# Patient Record
Sex: FEMALE | Race: WHITE | NOT HISPANIC OR LATINO
[De-identification: names, ages, dates, MRNs, and addresses within clinical notes are randomized per-mention and may not be internally consistent; named-entity substitution may affect disease eponyms.]

---

## 2019-10-20 ENCOUNTER — RESULT REVIEW (OUTPATIENT)
Age: 69
End: 2019-10-20

## 2023-09-25 ENCOUNTER — APPOINTMENT (OUTPATIENT)
Dept: ORTHOPEDIC SURGERY | Facility: CLINIC | Age: 73
End: 2023-09-25
Payer: MEDICARE

## 2023-09-25 ENCOUNTER — NON-APPOINTMENT (OUTPATIENT)
Age: 73
End: 2023-09-25

## 2023-09-25 VITALS
HEIGHT: 67 IN | BODY MASS INDEX: 25.11 KG/M2 | HEART RATE: 80 BPM | SYSTOLIC BLOOD PRESSURE: 151 MMHG | WEIGHT: 160 LBS | DIASTOLIC BLOOD PRESSURE: 90 MMHG

## 2023-09-25 DIAGNOSIS — M54.9 DORSALGIA, UNSPECIFIED: ICD-10-CM

## 2023-09-25 PROCEDURE — 72170 X-RAY EXAM OF PELVIS: CPT

## 2023-09-25 PROCEDURE — 72110 X-RAY EXAM L-2 SPINE 4/>VWS: CPT

## 2023-09-25 PROCEDURE — 99204 OFFICE O/P NEW MOD 45 MIN: CPT | Mod: 25

## 2023-09-25 RX ORDER — LEVOTHYROXINE SODIUM 100 UG/1
100 TABLET ORAL
Refills: 0 | Status: ACTIVE | COMMUNITY

## 2023-10-18 ENCOUNTER — TRANSCRIPTION ENCOUNTER (OUTPATIENT)
Age: 73
End: 2023-10-18

## 2023-10-20 ENCOUNTER — OUTPATIENT (OUTPATIENT)
Dept: OUTPATIENT SERVICES | Facility: HOSPITAL | Age: 73
LOS: 1 days | Discharge: HOME | End: 2023-10-20
Payer: COMMERCIAL

## 2023-10-20 ENCOUNTER — APPOINTMENT (OUTPATIENT)
Dept: ORTHOPEDIC SURGERY | Facility: HOSPITAL | Age: 73
End: 2023-10-20

## 2023-10-20 VITALS
HEIGHT: 66 IN | TEMPERATURE: 98 F | OXYGEN SATURATION: 100 % | DIASTOLIC BLOOD PRESSURE: 61 MMHG | SYSTOLIC BLOOD PRESSURE: 128 MMHG | RESPIRATION RATE: 14 BRPM | HEART RATE: 72 BPM | WEIGHT: 158.07 LBS

## 2023-10-20 VITALS
SYSTOLIC BLOOD PRESSURE: 130 MMHG | DIASTOLIC BLOOD PRESSURE: 64 MMHG | RESPIRATION RATE: 18 BRPM | HEART RATE: 68 BPM | OXYGEN SATURATION: 100 %

## 2023-10-20 DIAGNOSIS — Z98.890 OTHER SPECIFIED POSTPROCEDURAL STATES: Chronic | ICD-10-CM

## 2023-10-20 DIAGNOSIS — M48.062 SPINAL STENOSIS, LUMBAR REGION WITH NEUROGENIC CLAUDICATION: ICD-10-CM

## 2023-10-20 PROCEDURE — 64483 NJX AA&/STRD TFRM EPI L/S 1: CPT | Mod: 50

## 2023-10-20 RX ORDER — LEVOTHYROXINE SODIUM 125 MCG
1 TABLET ORAL
Refills: 0 | DISCHARGE

## 2023-10-20 RX ORDER — LOVASTATIN 20 MG
1 TABLET ORAL
Refills: 0 | DISCHARGE

## 2023-10-20 RX ORDER — ROSUVASTATIN CALCIUM 5 MG/1
1 TABLET ORAL
Refills: 0 | DISCHARGE

## 2023-10-23 PROBLEM — Z86.39 PERSONAL HISTORY OF OTHER ENDOCRINE, NUTRITIONAL AND METABOLIC DISEASE: Chronic | Status: ACTIVE | Noted: 2023-10-18

## 2023-10-23 PROBLEM — N39.45: Chronic | Status: ACTIVE | Noted: 2023-10-18

## 2023-11-08 ENCOUNTER — APPOINTMENT (OUTPATIENT)
Dept: ORTHOPEDIC SURGERY | Facility: CLINIC | Age: 73
End: 2023-11-08
Payer: MEDICARE

## 2023-11-08 VITALS
DIASTOLIC BLOOD PRESSURE: 79 MMHG | HEIGHT: 67 IN | WEIGHT: 158 LBS | SYSTOLIC BLOOD PRESSURE: 121 MMHG | HEART RATE: 62 BPM | BODY MASS INDEX: 24.8 KG/M2

## 2023-11-08 PROCEDURE — 99213 OFFICE O/P EST LOW 20 MIN: CPT

## 2024-02-14 ENCOUNTER — APPOINTMENT (OUTPATIENT)
Dept: ORTHOPEDIC SURGERY | Facility: CLINIC | Age: 74
End: 2024-02-14
Payer: MEDICARE

## 2024-02-14 VITALS
SYSTOLIC BLOOD PRESSURE: 142 MMHG | HEART RATE: 75 BPM | WEIGHT: 159 LBS | HEIGHT: 67 IN | BODY MASS INDEX: 24.96 KG/M2 | DIASTOLIC BLOOD PRESSURE: 82 MMHG

## 2024-02-14 PROCEDURE — 99214 OFFICE O/P EST MOD 30 MIN: CPT | Mod: 25

## 2024-02-14 PROCEDURE — 96372 THER/PROPH/DIAG INJ SC/IM: CPT

## 2024-02-14 RX ORDER — TIZANIDINE 2 MG/1
2 TABLET ORAL EVERY 8 HOURS
Qty: 30 | Refills: 0 | Status: ACTIVE | COMMUNITY
Start: 2024-02-14 | End: 1900-01-01

## 2024-02-14 RX ORDER — KETOROLAC TROMETHAMINE 30 MG/ML
30 INJECTION, SOLUTION INTRAMUSCULAR; INTRAVENOUS
Qty: 1 | Refills: 0 | Status: COMPLETED | OUTPATIENT
Start: 2024-02-14

## 2024-02-14 RX ADMIN — KETOROLAC TROMETHAMINE 0 MG/ML: 30 INJECTION, SOLUTION INTRAMUSCULAR at 00:00

## 2024-02-14 NOTE — DISCUSSION/SUMMARY
[Medication Risks Reviewed] : Medication risks reviewed [de-identified] : X-rays of the lumbar spine performed in September 2023 reviewed by me again with the patient.  She has degenerative change lumbar spine primarily at L4-5 L5-S1 with spondylolisthesis noted at L5-S1.  Her current symptoms are likely related to facet arthropathy in the setting of spondylolisthesis and disc degeneration.  She denies any significant radicular pain at this time though her pain does radiate somewhat into the right buttock.  She was advised to Continue tylenol/advil prn Tizanidine Rx provided Acupuncture and PT Rx provided Toradal 30 mg IM by LPN - no incident  She will contact the office in a week to give me an update on her symptoms.  If there is persistence of pain especially with her buttock pain lumbar epidural steroid injection bilaterally at the L5 level may be considered given the history of spinal stenosis and neurogenic claudication that the patient has had.  She also has had a significant response to bilateral L4 transforaminal epidural injections in the past.  The patient was educated regarding their condition, treatment options as well as prescribed course of treatment.  Risks and benefits as well as alternatives to the proposed treatment were also provided to the patient  They were given the opportunity to have all their questions answered to their satisfaction.  Vital signs were reviewed with the patient and the patient was instructed to followup with their primary care provider for further management. There were no PAs or scribes used in the evaluation, exam or treatment plan discussion. The surgeon was the primary evaluating or treating physician as noted above.

## 2024-02-14 NOTE — HISTORY OF PRESENT ILLNESS
[Walking] : walking [Daily] : ~He/She~ states the symptoms seem to be occuring daily [Rest] : relieved by rest [Improving] : improving [___ wks] : [unfilled] week(s) ago [6] : a current pain level of 6/10 [de-identified] : Patient is here today due to  acute flareup of low back oaun onchronic low back pain.  Patient states after her lumbar epidural injection in October 10/20/23 bilateral L4 no longer getting leg pain but still with her low back pain. No current medications started yoga, does it once a week, walks for exercise. [de-identified] : advil

## 2024-02-14 NOTE — PHYSICAL EXAM
[Normal] : Gait: normal [] : Motor: [___/5] : left ([unfilled]/5) [LE] : Sensory: Intact in bilateral lower extremities [1+] : left ankle jerk 1+ [SLR] : negative straight leg raise [Plantar Reflex Right Only] : absent on the right [Plantar Reflex Left Only] : absent on the left [DTR Reflexes Clonus Of Right Ankle (___ Beats)] : absent on the right [DTR Reflexes Clonus Of Left Ankle (___ Beats)] : absent on the left [de-identified] : The pt is awake, alert and oriented to self, place and time, is comfortable and in no acute distress. Inspection of neck, back and lower extremities bilaterally reveals no rashes or ecchymotic lesions.  There is no obvious abnormal spinal curvature in the sagittal and coronal planes. There is no tenderness over the cervical, thoracic or lumbar spine, or the paraspinal or upper and lower extremities musculature. There is no sacroiliac tenderness. No greater trochanteric tenderness bilaterally. No atrophy or abnormal movements noted in the upper or lower extremities. There is no swelling noted in the upper or lower extremities bilaterally. No cervical lymphadenopathy noted anteriorly. No joint laxity noted in the upper and lower extremity joints bilaterally. Hip range of motion is degrees internal rotation 30 external rotation without pain. Full range of motion of the shoulders bilaterally with no significant pain There is no groin pain with hip internal rotation and a negative STEVEN test bilaterally. [de-identified] : flex to her ankles, ext 30 degrees with low back pain minimal left gluteal tenderness

## 2024-06-12 ENCOUNTER — APPOINTMENT (OUTPATIENT)
Dept: ORTHOPEDIC SURGERY | Facility: CLINIC | Age: 74
End: 2024-06-12
Payer: MEDICARE

## 2024-06-12 VITALS
DIASTOLIC BLOOD PRESSURE: 83 MMHG | BODY MASS INDEX: 25.11 KG/M2 | HEART RATE: 67 BPM | WEIGHT: 160 LBS | SYSTOLIC BLOOD PRESSURE: 143 MMHG | HEIGHT: 67 IN

## 2024-06-12 DIAGNOSIS — M43.10 SPONDYLOLISTHESIS, SITE UNSPECIFIED: ICD-10-CM

## 2024-06-12 DIAGNOSIS — M48.062 SPINAL STENOSIS, LUMBAR REGION WITH NEUROGENIC CLAUDICATION: ICD-10-CM

## 2024-06-12 DIAGNOSIS — M51.37 OTHER INTERVERTEBRAL DISC DEGENERATION, LUMBOSACRAL REGION: ICD-10-CM

## 2024-06-12 DIAGNOSIS — M54.16 RADICULOPATHY, LUMBAR REGION: ICD-10-CM

## 2024-06-12 PROCEDURE — 99213 OFFICE O/P EST LOW 20 MIN: CPT

## 2024-06-14 NOTE — HISTORY OF PRESENT ILLNESS
[4] : a current pain level of 4/10 [5] : a maximum pain level of 5/10 [de-identified] : Patient is here for follow up of low back pain. Patient c/o right lateral thigh shooting pain and throbbing. Patient relieves pain with Advil and Tylenol.   - Summary : A patient with chronic low back pain and radiculopathy, presented for a follow-up visit regarding her ongoing symptoms and treatment plan. - Chief Complaint (CC) : Worsening low back pain and leg symptoms. - History of Present Illness (HPI) : Chief Complaint: Worsening low back pain and leg symptoms. Onset of Symptoms: Symptoms have been gradually worsening over the past few months. Characterization of the Discomfort: Lower back pain, leg heaviness, and pulling sensation in the front of the legs. Duration: Ongoing for several months. Location: Lower back, radiating to the legs, predominantly on the right side. Severity: Moderate to severe, interfering with sleep. Aggravating Factors: Not specified. Relieving Factors: Previous epidural steroid injection provided temporary relief. Associated Signs and Symptoms: Leg heaviness, pulling sensation in the front of the legs. Temporal Factors: Symptoms are worse at night, waking up 5-6 times to change positions. Context: Patient had a bilateral L4 epidural steroid injection in October 2023, which provided relief initially. Associated Symptoms: None mentioned. Severity and Impact: Moderate to severe, interfering with sleep and daily activities. Comparative Analysis: Symptoms are gradually worsening compared to the previous visit in February 2024. Patient's Medical Regimen: Currently taking Tylenol, Advil, tizanidine, and musceralax as prescribed. Contextual Factors: Patient has tried physical therapy and acupuncture but has not started them yet due to insurance coverage issues. Recent Medical Interventions: Previous epidural steroid injection in October 2023.

## 2024-06-14 NOTE — PHYSICAL EXAM
[Normal] : Gait: normal [] : Motor: [___/5] : left ([unfilled]/5) [LE] : Sensory: Intact in bilateral lower extremities [1+] : left ankle jerk 1+ [SLR] : negative straight leg raise [Plantar Reflex Right Only] : absent on the right [Plantar Reflex Left Only] : absent on the left [DTR Reflexes Clonus Of Left Ankle (___ Beats)] : absent on the left [DTR Reflexes Clonus Of Right Ankle (___ Beats)] : absent on the right [de-identified] : The pt is awake, alert and oriented to self, place and time, is comfortable and in no acute distress. Inspection of neck, back and lower extremities bilaterally reveals no rashes or ecchymotic lesions.  There is no obvious abnormal spinal curvature in the sagittal and coronal planes. There is no tenderness over the cervical, thoracic or lumbar spine, or the paraspinal or upper and lower extremities musculature. There is no sacroiliac tenderness. No greater trochanteric tenderness bilaterally. No atrophy or abnormal movements noted in the upper or lower extremities. There is no swelling noted in the upper or lower extremities bilaterally. No cervical lymphadenopathy noted anteriorly. No joint laxity noted in the upper and lower extremity joints bilaterally. Hip range of motion is degrees internal rotation 30 external rotation without pain. Full range of motion of the shoulders bilaterally with no significant pain There is no groin pain with hip internal rotation and a negative STEVEN test bilaterally. [de-identified] : flex to her ankles, ext 30 degrees with low back pain

## 2024-06-14 NOTE — DISCUSSION/SUMMARY
[Medication Risks Reviewed] : Medication risks reviewed [de-identified] : Assessment: - Summary : Sosa Parisi is a patient with chronic low back pain and radiculopathy, likely due to degenerative disc disease or spinal stenosis at the L4-L5 and L5-S1 levels. Her symptoms have been gradually worsening, with increased lower back pain and leg symptoms, predominantly on the right side. - Problems : - Chronic low back pain  - Radiculopathy  - Differential Diagnosis : - Degenerative disc disease  - Spinal stenosis  - Herniated disc  Plan: - Summary : The plan is to proceed with another epidural steroid injection targeting the right L4 and L5 nerve roots, as the previous injection provided temporary relief. Additionally, the patient will be advised to continue with conservative management, including medication, acupuncture, and physical therapy, if covered by insurance. - Plan : - Epidural steroid injection targeting the right L4 and L5 nerve roots, scheduled for the following week.  - Continue taking Tylenol, Advil, tizanidine, and musceralax as prescribed.  - Trial of gabapentin at night for leg symptoms, if tolerated without gastrointestinal side effects.  - Pursue acupuncture treatment, as insurance covers it fully.  - Consider physical therapy if insurance coverage becomes available.  - Follow-up visit after the epidural steroid injection to assess response and adjust treatment plan as needed.

## 2024-06-18 ENCOUNTER — TRANSCRIPTION ENCOUNTER (OUTPATIENT)
Age: 74
End: 2024-06-18

## 2024-06-20 VITALS
SYSTOLIC BLOOD PRESSURE: 120 MMHG | DIASTOLIC BLOOD PRESSURE: 64 MMHG | HEART RATE: 75 BPM | OXYGEN SATURATION: 100 % | RESPIRATION RATE: 17 BRPM | TEMPERATURE: 98 F

## 2024-06-21 ENCOUNTER — APPOINTMENT (OUTPATIENT)
Dept: ORTHOPEDIC SURGERY | Facility: HOSPITAL | Age: 74
End: 2024-06-21

## 2024-06-21 ENCOUNTER — OUTPATIENT (OUTPATIENT)
Dept: OUTPATIENT SERVICES | Facility: HOSPITAL | Age: 74
LOS: 1 days | End: 2024-06-21
Payer: COMMERCIAL

## 2024-06-21 VITALS
DIASTOLIC BLOOD PRESSURE: 77 MMHG | SYSTOLIC BLOOD PRESSURE: 147 MMHG | OXYGEN SATURATION: 100 % | HEART RATE: 70 BPM | RESPIRATION RATE: 14 BRPM

## 2024-06-21 DIAGNOSIS — M51.37 OTHER INTERVERTEBRAL DISC DEGENERATION, LUMBOSACRAL REGION: ICD-10-CM

## 2024-06-21 DIAGNOSIS — M54.16 RADICULOPATHY, LUMBAR REGION: ICD-10-CM

## 2024-06-21 DIAGNOSIS — Z90.89 ACQUIRED ABSENCE OF OTHER ORGANS: Chronic | ICD-10-CM

## 2024-06-21 DIAGNOSIS — M43.10 SPONDYLOLISTHESIS, SITE UNSPECIFIED: ICD-10-CM

## 2024-06-21 DIAGNOSIS — M48.062 SPINAL STENOSIS, LUMBAR REGION WITH NEUROGENIC CLAUDICATION: ICD-10-CM

## 2024-06-21 PROCEDURE — 64483 NJX AA&/STRD TFRM EPI L/S 1: CPT | Mod: RT

## 2024-06-21 PROCEDURE — 64484 NJX AA&/STRD TFRM EPI L/S EA: CPT | Mod: RT

## 2024-07-10 ENCOUNTER — APPOINTMENT (OUTPATIENT)
Dept: ORTHOPEDIC SURGERY | Facility: CLINIC | Age: 74
End: 2024-07-10
Payer: MEDICARE

## 2024-07-10 VITALS
HEIGHT: 67 IN | SYSTOLIC BLOOD PRESSURE: 131 MMHG | WEIGHT: 160 LBS | HEART RATE: 64 BPM | DIASTOLIC BLOOD PRESSURE: 81 MMHG | BODY MASS INDEX: 25.11 KG/M2

## 2024-07-10 DIAGNOSIS — M54.16 RADICULOPATHY, LUMBAR REGION: ICD-10-CM

## 2024-07-10 DIAGNOSIS — M51.37 OTHER INTERVERTEBRAL DISC DEGENERATION, LUMBOSACRAL REGION: ICD-10-CM

## 2024-07-10 DIAGNOSIS — M48.062 SPINAL STENOSIS, LUMBAR REGION WITH NEUROGENIC CLAUDICATION: ICD-10-CM

## 2024-07-10 DIAGNOSIS — M43.10 SPONDYLOLISTHESIS, SITE UNSPECIFIED: ICD-10-CM

## 2024-07-10 PROCEDURE — 99213 OFFICE O/P EST LOW 20 MIN: CPT

## 2024-09-12 ENCOUNTER — TRANSCRIPTION ENCOUNTER (OUTPATIENT)
Age: 74
End: 2024-09-12

## 2025-07-11 ENCOUNTER — EMERGENCY (EMERGENCY)
Facility: HOSPITAL | Age: 75
LOS: 1 days | End: 2025-07-11
Attending: STUDENT IN AN ORGANIZED HEALTH CARE EDUCATION/TRAINING PROGRAM
Payer: COMMERCIAL

## 2025-07-11 ENCOUNTER — RESULT REVIEW (OUTPATIENT)
Age: 75
End: 2025-07-11

## 2025-07-11 VITALS
WEIGHT: 164.91 LBS | HEART RATE: 88 BPM | TEMPERATURE: 98 F | RESPIRATION RATE: 18 BRPM | DIASTOLIC BLOOD PRESSURE: 96 MMHG | SYSTOLIC BLOOD PRESSURE: 165 MMHG | OXYGEN SATURATION: 98 % | HEIGHT: 66 IN

## 2025-07-11 VITALS
HEART RATE: 71 BPM | DIASTOLIC BLOOD PRESSURE: 71 MMHG | OXYGEN SATURATION: 99 % | SYSTOLIC BLOOD PRESSURE: 148 MMHG | RESPIRATION RATE: 18 BRPM | TEMPERATURE: 98 F

## 2025-07-11 DIAGNOSIS — Z90.89 ACQUIRED ABSENCE OF OTHER ORGANS: Chronic | ICD-10-CM

## 2025-07-11 DIAGNOSIS — Z98.890 OTHER SPECIFIED POSTPROCEDURAL STATES: Chronic | ICD-10-CM

## 2025-07-11 LAB
A1C WITH ESTIMATED AVERAGE GLUCOSE RESULT: 5.5 % — SIGNIFICANT CHANGE UP (ref 4–5.6)
ALBUMIN SERPL ELPH-MCNC: 3.8 G/DL — SIGNIFICANT CHANGE UP (ref 3.3–5)
ALP SERPL-CCNC: 61 U/L — SIGNIFICANT CHANGE UP (ref 40–120)
ALT FLD-CCNC: 18 U/L — SIGNIFICANT CHANGE UP (ref 10–45)
ANION GAP SERPL CALC-SCNC: 17 MMOL/L — SIGNIFICANT CHANGE UP (ref 5–17)
APTT BLD: 27.8 SEC — SIGNIFICANT CHANGE UP (ref 26.1–36.8)
AST SERPL-CCNC: 26 U/L — SIGNIFICANT CHANGE UP (ref 10–40)
BASOPHILS # BLD AUTO: 0.03 K/UL — SIGNIFICANT CHANGE UP (ref 0–0.2)
BASOPHILS NFR BLD AUTO: 0.4 % — SIGNIFICANT CHANGE UP (ref 0–2)
BILIRUB SERPL-MCNC: 0.3 MG/DL — SIGNIFICANT CHANGE UP (ref 0.2–1.2)
BUN SERPL-MCNC: 13 MG/DL — SIGNIFICANT CHANGE UP (ref 7–23)
CALCIUM SERPL-MCNC: 9.5 MG/DL — SIGNIFICANT CHANGE UP (ref 8.4–10.5)
CHLORIDE SERPL-SCNC: 100 MMOL/L — SIGNIFICANT CHANGE UP (ref 96–108)
CHOLEST SERPL-MCNC: 151 MG/DL — SIGNIFICANT CHANGE UP
CO2 SERPL-SCNC: 20 MMOL/L — LOW (ref 22–31)
CREAT SERPL-MCNC: 0.61 MG/DL — SIGNIFICANT CHANGE UP (ref 0.5–1.3)
EGFR: 94 ML/MIN/1.73M2 — SIGNIFICANT CHANGE UP
EGFR: 94 ML/MIN/1.73M2 — SIGNIFICANT CHANGE UP
EOSINOPHIL # BLD AUTO: 0.15 K/UL — SIGNIFICANT CHANGE UP (ref 0–0.5)
EOSINOPHIL NFR BLD AUTO: 2 % — SIGNIFICANT CHANGE UP (ref 0–6)
ESTIMATED AVERAGE GLUCOSE: 111 MG/DL — SIGNIFICANT CHANGE UP (ref 68–114)
GLUCOSE SERPL-MCNC: 105 MG/DL — HIGH (ref 70–99)
HCT VFR BLD CALC: 38 % — SIGNIFICANT CHANGE UP (ref 34.5–45)
HDLC SERPL-MCNC: 59 MG/DL — SIGNIFICANT CHANGE UP
HGB BLD-MCNC: 12 G/DL — SIGNIFICANT CHANGE UP (ref 11.5–15.5)
IMM GRANULOCYTES # BLD AUTO: 0.02 K/UL — SIGNIFICANT CHANGE UP (ref 0–0.07)
IMM GRANULOCYTES NFR BLD AUTO: 0.3 % — SIGNIFICANT CHANGE UP (ref 0–0.9)
INR BLD: 1.04 RATIO — SIGNIFICANT CHANGE UP (ref 0.85–1.16)
LDLC SERPL-MCNC: 84 MG/DL — SIGNIFICANT CHANGE UP
LIPID PNL WITH DIRECT LDL SERPL: 84 MG/DL — SIGNIFICANT CHANGE UP
LYMPHOCYTES # BLD AUTO: 1.85 K/UL — SIGNIFICANT CHANGE UP (ref 1–3.3)
LYMPHOCYTES NFR BLD AUTO: 25 % — SIGNIFICANT CHANGE UP (ref 13–44)
MCHC RBC-ENTMCNC: 30 PG — SIGNIFICANT CHANGE UP (ref 27–34)
MCHC RBC-ENTMCNC: 31.6 G/DL — LOW (ref 32–36)
MCV RBC AUTO: 95 FL — SIGNIFICANT CHANGE UP (ref 80–100)
MONOCYTES # BLD AUTO: 0.57 K/UL — SIGNIFICANT CHANGE UP (ref 0–0.9)
MONOCYTES NFR BLD AUTO: 7.7 % — SIGNIFICANT CHANGE UP (ref 2–14)
NEUTROPHILS # BLD AUTO: 4.79 K/UL — SIGNIFICANT CHANGE UP (ref 1.8–7.4)
NEUTROPHILS NFR BLD AUTO: 64.6 % — SIGNIFICANT CHANGE UP (ref 43–77)
NONHDLC SERPL-MCNC: 92 MG/DL — SIGNIFICANT CHANGE UP
NRBC # BLD AUTO: 0 K/UL — SIGNIFICANT CHANGE UP (ref 0–0)
NRBC # FLD: 0 K/UL — SIGNIFICANT CHANGE UP (ref 0–0)
NRBC BLD AUTO-RTO: 0 /100 WBCS — SIGNIFICANT CHANGE UP (ref 0–0)
PLATELET # BLD AUTO: 273 K/UL — SIGNIFICANT CHANGE UP (ref 150–400)
PMV BLD: 9.9 FL — SIGNIFICANT CHANGE UP (ref 7–13)
POTASSIUM SERPL-MCNC: 4.5 MMOL/L — SIGNIFICANT CHANGE UP (ref 3.5–5.3)
POTASSIUM SERPL-SCNC: 4.5 MMOL/L — SIGNIFICANT CHANGE UP (ref 3.5–5.3)
PROT SERPL-MCNC: 7.3 G/DL — SIGNIFICANT CHANGE UP (ref 6–8.3)
PROTHROM AB SERPL-ACNC: 11.9 SEC — SIGNIFICANT CHANGE UP (ref 9.9–13.4)
RBC # BLD: 4 M/UL — SIGNIFICANT CHANGE UP (ref 3.8–5.2)
RBC # FLD: 12.9 % — SIGNIFICANT CHANGE UP (ref 10.3–14.5)
SODIUM SERPL-SCNC: 137 MMOL/L — SIGNIFICANT CHANGE UP (ref 135–145)
TRIGL SERPL-MCNC: 35 MG/DL — SIGNIFICANT CHANGE UP
TROPONIN T, HIGH SENSITIVITY RESULT: 13 NG/L — SIGNIFICANT CHANGE UP (ref 0–51)
WBC # BLD: 7.41 K/UL — SIGNIFICANT CHANGE UP (ref 3.8–10.5)
WBC # FLD AUTO: 7.41 K/UL — SIGNIFICANT CHANGE UP (ref 3.8–10.5)

## 2025-07-11 PROCEDURE — G0378: CPT

## 2025-07-11 PROCEDURE — 93005 ELECTROCARDIOGRAM TRACING: CPT

## 2025-07-11 PROCEDURE — 99291 CRITICAL CARE FIRST HOUR: CPT | Mod: 25

## 2025-07-11 PROCEDURE — 71045 X-RAY EXAM CHEST 1 VIEW: CPT | Mod: 26

## 2025-07-11 PROCEDURE — 70551 MRI BRAIN STEM W/O DYE: CPT

## 2025-07-11 PROCEDURE — 83036 HEMOGLOBIN GLYCOSYLATED A1C: CPT

## 2025-07-11 PROCEDURE — 70498 CT ANGIOGRAPHY NECK: CPT

## 2025-07-11 PROCEDURE — 93010 ELECTROCARDIOGRAM REPORT: CPT

## 2025-07-11 PROCEDURE — 85610 PROTHROMBIN TIME: CPT

## 2025-07-11 PROCEDURE — 93880 EXTRACRANIAL BILAT STUDY: CPT

## 2025-07-11 PROCEDURE — 82962 GLUCOSE BLOOD TEST: CPT

## 2025-07-11 PROCEDURE — 80053 COMPREHEN METABOLIC PANEL: CPT

## 2025-07-11 PROCEDURE — 0042T: CPT

## 2025-07-11 PROCEDURE — 70498 CT ANGIOGRAPHY NECK: CPT | Mod: 26

## 2025-07-11 PROCEDURE — 70496 CT ANGIOGRAPHY HEAD: CPT | Mod: 26

## 2025-07-11 PROCEDURE — 70551 MRI BRAIN STEM W/O DYE: CPT | Mod: 26

## 2025-07-11 PROCEDURE — 84484 ASSAY OF TROPONIN QUANT: CPT

## 2025-07-11 PROCEDURE — 80061 LIPID PANEL: CPT

## 2025-07-11 PROCEDURE — 93880 EXTRACRANIAL BILAT STUDY: CPT | Mod: 26

## 2025-07-11 PROCEDURE — 70450 CT HEAD/BRAIN W/O DYE: CPT | Mod: 26,XU

## 2025-07-11 PROCEDURE — 71045 X-RAY EXAM CHEST 1 VIEW: CPT

## 2025-07-11 PROCEDURE — 70450 CT HEAD/BRAIN W/O DYE: CPT

## 2025-07-11 PROCEDURE — 93306 TTE W/DOPPLER COMPLETE: CPT

## 2025-07-11 PROCEDURE — 93306 TTE W/DOPPLER COMPLETE: CPT | Mod: 26

## 2025-07-11 PROCEDURE — 70496 CT ANGIOGRAPHY HEAD: CPT

## 2025-07-11 PROCEDURE — 85730 THROMBOPLASTIN TIME PARTIAL: CPT

## 2025-07-11 PROCEDURE — 85025 COMPLETE CBC W/AUTO DIFF WBC: CPT

## 2025-07-11 RX ORDER — LEVOTHYROXINE SODIUM 300 MCG
100 TABLET ORAL DAILY
Refills: 0 | Status: DISCONTINUED | OUTPATIENT
Start: 2025-07-11 | End: 2025-07-14

## 2025-07-11 RX ORDER — ASPIRIN 325 MG
81 TABLET ORAL ONCE
Refills: 0 | Status: COMPLETED | OUTPATIENT
Start: 2025-07-11 | End: 2025-07-11

## 2025-07-11 RX ORDER — CLOPIDOGREL BISULFATE 75 MG/1
300 TABLET, FILM COATED ORAL ONCE
Refills: 0 | Status: COMPLETED | OUTPATIENT
Start: 2025-07-11 | End: 2025-07-11

## 2025-07-11 RX ORDER — ATORVASTATIN CALCIUM 80 MG/1
40 TABLET, FILM COATED ORAL AT BEDTIME
Refills: 0 | Status: DISCONTINUED | OUTPATIENT
Start: 2025-07-11 | End: 2025-07-11

## 2025-07-11 RX ORDER — ATORVASTATIN CALCIUM 80 MG/1
40 TABLET, FILM COATED ORAL AT BEDTIME
Refills: 0 | Status: DISCONTINUED | OUTPATIENT
Start: 2025-07-11 | End: 2025-07-14

## 2025-07-11 RX ADMIN — Medication 81 MILLIGRAM(S): at 03:27

## 2025-07-11 RX ADMIN — CLOPIDOGREL BISULFATE 300 MILLIGRAM(S): 75 TABLET, FILM COATED ORAL at 03:27

## 2025-07-11 RX ADMIN — Medication 100 MICROGRAM(S): at 05:52

## 2025-07-11 NOTE — ED CDU PROVIDER INITIAL DAY NOTE - PHYSICAL EXAMINATION
CONSTITUTIONAL: Patient is awake, alert and oriented x 3. Patient is well appearing and in no acute distress  HEAD: NCAT  EYES: PERRL b/l, EOMI  NECK: supple, FROM  LUNGS: CTA b/l, no wheezing or rales   HEART: RRR.+S1S2   ABDOMEN: Soft, non-distended, nttp,  no rebound or guarding  EXTREMITY: FROM upper and lower ext b/l  SKIN: No rash or lesions  NEURO: Cn3-12 grossly intact. Strength 5/5 UE/LE b/l. Sensory change left lower 1/2 face, left forearm, left lower leg compared to right

## 2025-07-11 NOTE — CONSULT NOTE ADULT - ATTENDING COMMENTS
Code stroke called and neurology emergently  74yo right handed woman wit Hypothyroidism, HLD, newly diagnosed hypertension has not started on antihypertensive yet, came to the ER for sudden onset of left-sided numbness.  On exam left sided mild sensory deficit was found in upper and lower extremity and in the lower part of the face. No weakness, ataxia or dysmetria was noticed, walking and gait was normal.   LKW: 2230 on 07/10/25  NIHSS:  1 (left sided mild sensory impairment) --> improved NIHSS 0   Baseline MRS: 0  Not a Teneceteplase candidate due to non-disabling neurological deficits with NIHSS of 1 -->0   Not a thrombectomy candidate due to no LVO  CTH neg   CTA H/N neg  CTP neg   MRI brain neg for new or old infarcts   CD neg     Impression:   L sided numnbess improved; possible related to cervical or lumbar pathology.  follows with pain management outaptient and gets epiudral injectoins  lumbar radiculopathy     Recommendations:  - no need for asa, plavix or statin from stroke standtpoint, can stop   - A1c and lipid panel   - BP per primary team   - Tight glucose control (long-term goal HgbA1c < 6%)  - Stroke education and counseling  - Stroke Neuro-checks and VS q4h   - Dysphagia screen. If fails, speech/swallow eval  - aspiration, fall precautions  - STAT CT head non-contrast for change in neuro exam.   - PT/ OT / DVT ppx per primary team   - no objection to d/c planning   Kd Lee MD  Vascular Neurology  Office: [phone]

## 2025-07-11 NOTE — ED PROVIDER NOTE - OBJECTIVE STATEMENT
75 female PMHx HLD Hypothyroid   Yesterday visited PCP and found to be hypertensive prescribed 2 anti-HTNs meds.   Reports LKN 10:30 PM, woke up 11:30 PM since left hand/foot tingling.   Patient reports also having difficulty in walking from left side and seems to be bearing weight on right side of her body.   Patient also reports palpitations but denies any other symptoms,   No headache, dizziness, swallowing difficulty, SOB, chest pain, vomiting, abd pain.   No prior similar history in past.

## 2025-07-11 NOTE — ED ADULT NURSE REASSESSMENT NOTE - NS ED NURSE REASSESS COMMENT FT1
no change in condition; ambulated to br to void; filling out MRI Questionnaire. Lovenox 1 mg/kg as above  Regular diet  Normal activity

## 2025-07-11 NOTE — ED CDU PROVIDER DISPOSITION NOTE - CLINICAL COURSE
75 female with PMHx HLD, Hypothyroid, newly diagnosed HTN (has not yet started meds) presents to the ED for eval of left foot and arm numbness/tingling which she noticed at 11:30pm with LKN 10:30 PM.  Patient reports also having difficulty in walking from left side and seems to be bearing weight on right side of her body. She also reports palpitations.  She denies headache, dizziness, swallowing difficulty, SOB, chest pain, vomiting, abd pain. No prior similar history in past.  In ED patient was a code stroke, Overall initial lab work and CT/CTA imaging were non-actionable.  Neurology recommended Plavix loading patient with plan for MRI brain, TTE and carotid Doppler. Patient accepted to CDU for continued management  In CDU *** 75 female with PMHx HLD, Hypothyroid, newly diagnosed HTN (has not yet started meds) presents to the ED for eval of left foot and arm numbness/tingling which she noticed at 11:30pm with LKN 10:30 PM.  Patient reports also having difficulty in walking from left side and seems to be bearing weight on right side of her body. She also reports palpitations.  She denies headache, dizziness, swallowing difficulty, SOB, chest pain, vomiting, abd pain. No prior similar history in past.  In ED patient was a code stroke, Overall initial lab work and CT/CTA imaging were non-actionable.  Neurology recommended Plavix loading patient with plan for MRI brain, TTE and carotid Doppler. Patient accepted to CDU for continued management  In CDU, pt in NAD.  VSS.  Patient resting comfortably without complaints. W/u non actionable. Seen by neuro, recommending outpt f/u. No indication for ASA/AP or statin at this time. Copy of results provided. Patient stable for discharge.

## 2025-07-11 NOTE — ED ADULT NURSE NOTE - BREATHING, MLM
[address] [id number] [phone]               Thank you for choosing us for your health care visit with Abimbola Tucker MD.  We are glad to serve you and happy to provide you with this summary of Take 1 tablet (3.125 mg total) by mouth 2 (two) times daily with meals. Commonly known as:  COREG           docusate sodium 100 MG Caps   Take 100 mg by mouth 2 (two) times daily.    Commonly known as:  COLACE           Doxycycline Hyclate 100 MG Caps   F Component Value Standard Range & Units    GLUCOSE (URINE DIPSTICK) negative Negative mg/dL    BILIRUBIN neg Negative    KETONES (URINE DIPSTICK) neg Negative mg/dL    SPECIFIC GRAVITY 1.020 1.005 - 1.030    OCCULT BLOOD neg Negative    PH, URINE 5.5 4.5 Spontaneous, unlabored and symmetrical

## 2025-07-11 NOTE — ED CDU PROVIDER INITIAL DAY NOTE - OBJECTIVE STATEMENT
75 female with PMHx HLD, Hypothyroid, newly diagnosed HTN (has not yet started meds) presents to the ED for eval of left foot and arm numbness/tingling which she noticed at 11:30pm with LKN 10:30 PM.  Patient reports also having difficulty in walking from left side and seems to be bearing weight on right side of her body. She also reports palpitations.  She denies headache, dizziness, swallowing difficulty, SOB, chest pain, vomiting, abd pain. No prior similar history in past.  In ED patient was a code stroke, Overall initial lab work and CT/CTA imaging were non-actionable.  Neurology recommended Plavix loading patient with plan for MRI brain, TTE and carotid Doppler. Patient accepted to CDU for continued management

## 2025-07-11 NOTE — ED PROVIDER NOTE - CLINICAL SUMMARY MEDICAL DECISION MAKING FREE TEXT BOX
75 female PMHx HLD Hypothyroid reports LKN 10:30 PM, woke up 11:30 PM since left hand/foot tingling. VSS. On exam reduced sensory change to left lower 1/2 of face, left forearm and left lower leg. Concern for ICH v/s CVA. Will perform stroke workup.

## 2025-07-11 NOTE — ED ADULT NURSE NOTE - OBJECTIVE STATEMENT
75y F BIB self p/w left sided numbness. Pt is axo4, ambulates independently at baseline. Code stroke activated. 75 female PMHx HLD Hypothyroid. Yesterday visited PCP and found to be hypertensive prescribed 2 anti-HTNs meds but has not started medication yet. Reports LKN 10:30 PM, woke up 11:30 PM since left hand/foot tingling. Patient reports also having difficulty in walking from left side and seems to be bearing weight on right side of her body. Patient also reports palpitations but denies any other symptoms, Denies headache, dizziness, vision changes, chest pain, shortness of breath, abdominal pain, nausea, vomiting, diarrhea, fevers, chills, dysuria, recent travel or fall. Patient undressed and placed into gown, call bell in hand and side rails up with bed in lowest position for safety. blanket provided. Comfort and safety provided. Placed on cardiac monitor, NSR.

## 2025-07-11 NOTE — CONSULT NOTE ADULT - ASSESSMENT
76yo right handed woman with a PMHx significant for Hypothyroidism, HLD, newly diagnosed hypertension has not started on antihypertensive yet, came to the ER for sudden onset of left-sided numbness.  On exam left sided mild sensory deficit was found in upper and lower extremity and in the lower part of the face. No weakness, ataxia or dysmetria was noticed, walking and gait was normal.     LKW: 2230 on 07/10/25  NIHSS:  1 (left sided mild sensory impairment)   Baseline MRS: 0  Not a Teneceteplase candidate due to non-disabling neurological deficits with NIHSS of 1   Not a thrombectomy candidate due to no LVO    Impression: Left sided sensory syndrome localizing to right cerebral dysfunction with some improvement since the onset, likely due to acute ischemic stroke vs TIA. Underlying mechanism is unknown at his moment, likely small vessel disease; will need further workup.    Recommendations:  [] Aspirin 81mg daily + Plavix load with 300mg x1 then 75mg daily. Would continue with aspirin 81mg and Plavix 75mg for 3 weeks followed by aspirin 81 alone (as per CHANCE and POINT trials).  [] Atorvastatin 40 mg daily titrated per LDL target based on ASCVD risk score  [] HgbA1C, fasting lipid panel, CBC, CMP, coag panel, troponin  [] MRI brain w/o (can be done ny admitting the patient in CDU)  [] TTE w/o bubble study  [] Carotid doppler   [] telemetry to check for arrhythmia, EKG,   - Tight glucose control (long-term goal HgbA1c < 6%)  - Stroke education and counseling  - Stroke Neuro-checks and VS q4h  - Permissive HTN up to 220/120 for 24-48h from symptom onset  - Dysphagia screen. If fails, speech/swallow eval  - aspiration, fall precautions  - STAT CT head non-contrast for change in neuro exam.   - PT/ OT / DVT ppx per primary team     Discussed with stroke fellow Trey and under supervision of attending Delonte regarding decision against candidacy for Tenecteplase / thrombectomy. Will be formally staffed on morning rounds with attending. 76yo right handed woman with a PMHx significant for Hypothyroidism, HLD, newly diagnosed hypertension has not started on antihypertensive yet, came to the ER for sudden onset of left-sided numbness.  On exam left sided mild sensory deficit was found in upper and lower extremity and in the lower part of the face. No weakness, ataxia or dysmetria was noticed, walking and gait was normal.     LKW: 2230 on 07/10/25  NIHSS:  1 (left sided mild sensory impairment)   Baseline MRS: 0  Not a Teneceteplase candidate due to non-disabling neurological deficits with NIHSS of 1   Not a thrombectomy candidate due to no LVO    Impression: Left sided sensory syndrome localizing to right cerebral dysfunction with some improvement since the onset, likely due to acute ischemic stroke vs TIA. Underlying mechanism is unknown at his moment, likely small vessel disease; will need further workup.    Recommendations:  [] Aspirin 81mg daily + Plavix load with 300mg x1 then 75mg daily. Would continue with aspirin 81mg and Plavix 75mg for 3 weeks followed by aspirin 81 alone (as per CHANCE and POINT trials).  [] Atorvastatin 40 mg daily titrated per LDL target based on ASCVD risk score  [] HgbA1C, fasting lipid panel, CBC, CMP, coag panel, troponin  [] MRI brain w/o (can be done ny admitting the patient in CDU)  [] TTE w/o bubble study  [] Carotid doppler   [] telemetry to check for arrhythmia, EKG,   - Tight glucose control (long-term goal HgbA1c < 6%)  - Stroke education and counseling  - Stroke Neuro-checks and VS q4h  - Permissive HTN up to 220/120 for 24-48h from symptom onset  - Dysphagia screen. If fails, speech/swallow eval  - aspiration, fall precautions  - STAT CT head non-contrast for change in neuro exam.   - PT/ OT / DVT ppx per primary team     Discussed with stroke fellow Trey and under supervision of attending Delonte regarding decision against candidacy for Tenecteplase / thrombectomy. Will be formally staffed on morning rounds with attending.  see attending attestation for final recs

## 2025-07-11 NOTE — ED CDU PROVIDER DISPOSITION NOTE - PATIENT PORTAL LINK FT
You can access the FollowMyHealth Patient Portal offered by [facility] by registering at the following website: [email or web address]. By joining [facility]’s FollowMyHealth portal, you will also be able to view your health information using other applications (apps) compatible with our system.

## 2025-07-11 NOTE — ED PROVIDER NOTE - ATTENDING CONTRIBUTION TO CARE
75-year-old female with last known normal July 10 at 10:30 PM presenting for left hand numbness, left foot numbness and left foot weakness.  Patient has a history of uncontrolled hypertension has not started her outpatient hypertensive medications.  Denies fever chills, chest pain, shortness of breath, trauma.  No speech changes, no vision changes.  Not on any antiplatelets or anticoagulants.  Called as a code stroke given in tenecteplase and thrombectomy window.    Hemodynamically stable. NAD.  NIHSS 1.  Patient aaox4 to person, place, time, event. CNs intact w/ symm face except dec in left V2 and V3 facial distribution.. , PERRL 3mm, EOMI w/o nystagmus, normal phonation. 5/5 str all 4 extrem w/ intact sensation to touch, but decrease sensation in the left lower leg from the knee to the foot, left upper arm from the elbow to the hand Well coordinated. No drift. Steady gait. 2+ distal pulses, equal b/l., well hydrated, RRR, no audible cardiac murmurs. clear lungs, no inc work of breathing. benign abdomen, - martinez, no peritoneal signs, no cva ttp. no visible rashes nor deformities, no signs of jaundice, fluid overload, nor anemia. symm calves, no edema.    Acute onset of left upper, left lower extremity paresthesia with subjective left lower extremity weakness, left facial decree sensation with NIHSS 1 concerning for primary neurologic etiology such as stroke, possibility of thalamic region.  Plan for labs, CT head, CT angio head and neck, CT perfusion, labs, EKG and neurology consultation.  Summary of presentation, physical exam findings, plan, expected turn around time for diagnostics discussed with patient. Agrees.    I, Jamison Higgins (ED Attending), independently interpreted the EKG:  Interpreted at: 3:59 AM  Normal sinus rhythm rate 82   QTc 460, incomplete right bundle branch block  No diagnostic ischemic ekg changes.

## 2025-07-11 NOTE — CONSULT NOTE ADULT - SUBJECTIVE AND OBJECTIVE BOX
Neurology - Consult Note    -  Spectra: [id number] ([facility]), [phone] ([facility])  -    HPI: Patient MARY GARCIAS is a 75y (1950) right handed woman with a PMHx significant for Hypothyroidism, HLD, newly diagnosed hypertension has not started on antihypertensive yet, came to the ER for sudden onset of left-sided numbness.  As per the patient she went to bed around 10:30 PM on July 10, then woke up around 11:30 PM with feeling of numbness in the left upper and lower extremities and left side of the face.  There was no weakness, dizziness or vertigo or headache.  Patient denied any blurry vision.  When the patient was trying to walk she was feeling little bit wobbly in the left leg.  But the patient was able to walk without any help or support.  Patient never had similar kind of headache in the past.  Patient was feeling little bit foggy in the head for last few weeks and she visited her primary care physician on July 9, where she was diagnosed with hypertension.  She was prescribed valsartan but was unable to fill out the prescription yet and has not started the medication.  The patient denied any chest pain, palpitation, shortness of breath or recent fever.      Review of Systems:    CONSTITUTIONAL: No fevers or chills  EYES AND ENT: No visual changes or no throat pain   NECK: No pain or stiffness  RESPIRATORY: No hemoptysis or shortness of breath  CARDIOVASCULAR: No chest pain or palpitations  GASTROINTESTINAL: No melena or hematochezia  GENITOURINARY: No dysuria or hematuria  NEUROLOGICAL: +As stated in HPI above  SKIN: No itching, burning, rashes, or lesions   All other review of systems is negative unless indicated above.    Allergies:  codeine (Rash)  latex (Hives)      PMHx/PSHx/Family Hx: As above, otherwise see below   H/O thyroid disease    Urinary incontinence with continuous leakage        Social Hx:  No current use of tobacco, alcohol, or illicit drugs  Lives with ***    Medications:  MEDICATIONS  (STANDING):  aspirin  chewable 81 milliGRAM(s) Oral once  atorvastatin 40 milliGRAM(s) Oral at bedtime  clopidogrel Tablet 300 milliGRAM(s) Oral Once    MEDICATIONS  (PRN):      Vitals:  T(C): 36.6 (07-11-25 @ 01:21), Max: 36.6 (07-11-25 @ 01:21)  HR: 88 (07-11-25 @ 01:21) (88 - 88)  BP: 165/96 (07-11-25 @ 01:21) (165/96 - 165/96)  RR: 18 (07-11-25 @ 01:21) (18 - 18)  SpO2: 98% (07-11-25 @ 01:21) (98% - 98%)    Physical Examination:  General - NAD, pleasant, cooperative   Cardiovascular - Peripheral pulses palpable, no edema  Neurologic Exam:  Mental status - Awake, Alert, Oriented to person, place, and time. Speech fluent, repetition and naming intact. Follows simple and complex commands. Attention/concentration, recent and remote memory, and fund of knowledge intact    Cranial nerves:  CN II: Visual fields are full to confrontation. Fundoscopic exam not done. Pupils are 4 mm and briskly reactive to light.   CN III, IV, VI: EOMI, no nystagmus, no ptosis  CN V: Facial sensation is slightly impaired in the left lower face (V2-V3), intact in the right in all 3 divisions  CN VII: Face is symmetric with normal eye closure and smile.  CN VII: Hearing is normal to rubbing fingers  CN IX, X: Palate elevates symmetrically. Phonation is normal.  CN XI: Head turning and shoulder shrug are intact  CN XII: Tongue is midline with normal movements and no atrophy.    Motor - Normal bulk and tone throughout. No pronator drift of out-stretched arms.  Strength testing          R/L:   Deltoid(C5) 5/5  Biceps(C6) 5/5   Triceps(C7) 5/5    Wrist Extension 5/5   Wrist Flexion (C8) 5/5    Interossei  (T1) 5/5     5/5                      R/L: Hip Flexion(L2/3) 5/5   Hip Extension (L4/5) 5/5  Knee Flexion (L4/5/S1) 5/5   Knee Extension (L3/4) 5/5  Dorsiflexion (L4/5) 5/5  Plantar Flexion (S1) 5/5  Sensation - Light touch impaired in the left lower face, left forearm and left leg below knee    DTR's -             Biceps      Triceps     Brachioradialis      Patellar    Ankle    Toes/plantar response  R             2+             2+                  2+                       2+            2+                 Down  L              2+             2+                 2+                        2+           2+                 Down    Coordination - Rapid alternating movements and fine finger movements are intact. There is no dysmetria on finger-to-nose and heel-knee-shin. There are no abnormal or extraneous movements.   Romberg- negative    Gait and station - Normal casual gait      Labs:                        12.0   7.41  )-----------( 273      ( 11 Jul 2025 02:33 )             38.0     07-11    137  |  100  |  13  ----------------------------<  105[H]  4.5   |  20[L]  |  0.61    Ca    9.5      11 Jul 2025 02:33    TPro  7.3  /  Alb  3.8  /  TBili  0.3  /  DBili  x   /  AST  26  /  ALT  18  /  AlkPhos  61  07-11    CAPILLARY BLOOD GLUCOSE  107 (11 Jul 2025 02:37)      POCT Blood Glucose.: 107 mg/dL (11 Jul 2025 02:27)    LIVER FUNCTIONS - ( 11 Jul 2025 02:33 )  Alb: 3.8 g/dL / Pro: 7.3 g/dL / ALK PHOS: 61 U/L / ALT: 18 U/L / AST: 26 U/L / GGT: x             PT/INR - ( 11 Jul 2025 02:33 )   PT: 11.9 sec;   INR: 1.04 ratio         PTT - ( 11 Jul 2025 02:33 )  PTT:27.8 sec  CSF:                  Radiology:  CT Brain Stroke Protocol (07.11.25 @ 02:45)  IMPRESSION:  No acute intracranial hemorrhage, mass effect, or midline shift.    If clinical symptoms persist or worsen, further evaluation with brain MRI   may be obtained, if no contraindications exist.    CT PERFUSION:  Technical limitations: None.    Core infarction: 0 ml  Penumbra / tissue at risk for active ischemia: 0 ml    CTA NECK:  No evidence of significant stenosis or occlusion.    CTA HEAD:  No large vessel occlusion, significant stenosis or vascular abnormality   identified.

## 2025-07-11 NOTE — ED CDU PROVIDER INITIAL DAY NOTE - PROGRESS NOTE DETAILS
Spoke to resident Dr. Muniz regarding request for carotid Doppler despite CTA without significant stenosis.  He advised that stroke fellow saw some calcifications within the common carotid. Recomends ordering test to see if it can be done but states e no need to hold discharge if otherwise cleared after MRI.  Mariya Zaragoza PA-C The patient is a 57y Female complaining of abdominal pain. Patient seen at bedside in NAD.  VSS.  Patient resting comfortably without complaints. W/u non actionable. Seen by neuro, recommending outpt f/u. No indication for ASA/AP or statin at this time. Copy of results provided. Patient stable for discharge.  Follow up instructions given, return to ED precautions reviewed. Importance of follow up emphasized, patient verbalized understanding.  All questions answered. Case dw Dr. Murcia. Paul Abdi PA-C

## 2025-07-11 NOTE — ED PROVIDER NOTE - PHYSICAL EXAMINATION
PHYSICAL EXAM:  GENERAL: NAD, lying in bed comfortably  HEAD: Normocephalic  EYES: EOMI, PERRLA, conjunctiva and sclera clear  ENT: No erythema/pallor/petechiae/lesions  NECK: Supple  LUNG: CTA b/l; no r/r/w  HEART: RRR, +S1/S2; No m/r/g  ABDOMEN: soft, NT/ND; BS audible   EXTREMITIES:  2+ Peripheral Pulses, brisk cap refill. No clubbing, cyanosis, or edema  NERVOUS SYSTEM:  AAOx3, speech clear. Sensory change left lower 1/2 face, left forearm, left lower leg. No motor loss. -ve cerebellar signs.   SKIN: No rashes or lesions

## 2025-07-11 NOTE — ED CDU PROVIDER DISPOSITION NOTE - ATTENDING APP SHARED VISIT CONTRIBUTION OF CARE
I personally have seen and examined this patient.  Physician assistant note reviewed and agree on plan of care and except where noted. Patient re-evaluated and doing well.  No acute issues at  this time.  Lab and radiology tests reviewed with patient and/or family.  Patient stable for discharge with nl mri results and doppler of carotid, echo. pt with non focal motor exam, ambulating to the bathroom.

## 2025-07-11 NOTE — ED ADULT TRIAGE NOTE - CHIEF COMPLAINT QUOTE
Patient reports that she wasn't feeling well this week, saw PCP and was told her BP was high and Rx was sent to pharmacy but haven't filled out yet, woke up today feeling tingling sensation in left arm and left leg weakness

## 2025-07-11 NOTE — ED CDU PROVIDER DISPOSITION NOTE - NSFOLLOWUPINSTRUCTIONS_ED_ALL_ED_FT
1. Please follow up with your Primary Care Doctor after discharge, bring a copy of your results to follow up appointment for review     2. Please rest, stay hydrated and continue all at home medications as previously prescribed    ??? Start Plavix 75mg to be taken for the next 3 weeks and Aspirin 81mg daily to be taken indefinitely ???  ??? Increase statin Rosuvastatin 5mg to ???      3. Follow up with Neurology after discharge for reevaluation and continued management. Please see office information below to call and schedule appointment:       Kd Lee    Neurology    [address] [facility] [address], [address]    [place] [address]    Phone: [phone]    Fax: [phone]    4. You will receive a call for any outstanding results or may call our ED Administration line at [phone] daily 9am-3pm to obtain results    5. Return to ED for any new or worsened symptoms of concern Please make sure to follow up with your primary care doctor within 1-2 days and with the NEUROLOGY specialist. The information for follow up can be found below. Bring a copy of all of your results with you to your follow up appointments.   Return to the ER as discussed if you develop any new or worsening symptoms.       Kd Lee    Neurology    [address] [facility] [address], [address]    [place] [address]    Phone: [phone]    Fax: [phone]

## 2025-07-11 NOTE — ED ADULT NURSE REASSESSMENT NOTE - NS ED NURSE REASSESS COMMENT FT1
pt received in CDU via WC from pink unit after report received from Shai GOODWIN; placed on bedside cardiac monitor upon arrival to CDU; pt is s/p code stroke in main ED; pt is alert and oriented x 4; SANCHO; c/o numbness to left hand and tingling LLE; speech clear, no drift, no droop; no c/o pain; oriented to CDU routines, use of call bell and location of br; pt voided in br upon arrival to CDU.

## 2025-07-11 NOTE — ED PROVIDER NOTE - PROGRESS NOTE DETAILS
Attending Jamison Higgins:  discussed w neuro, rec dapt, high intensity statin, CDU for mr. patient amenable to staying.

## 2025-07-11 NOTE — ED ADULT NURSE REASSESSMENT NOTE - NS ED NURSE REASSESS COMMENT FT1
[id number] Patient returned from testing daughter at bedside no signs of distress noted vital signs stable.

## 2025-09-15 DIAGNOSIS — Z00.00 ENCOUNTER FOR GENERAL ADULT MEDICAL EXAMINATION W/OUT ABNORMAL FINDINGS: ICD-10-CM

## 2025-09-16 ENCOUNTER — APPOINTMENT (OUTPATIENT)
Dept: ORTHOPEDIC SURGERY | Facility: CLINIC | Age: 75
End: 2025-09-16
Payer: MEDICARE

## 2025-09-16 VITALS — HEIGHT: 66 IN | BODY MASS INDEX: 26.36 KG/M2 | WEIGHT: 164 LBS

## 2025-09-16 DIAGNOSIS — M17.11 UNILATERAL PRIMARY OSTEOARTHRITIS, RIGHT KNEE: ICD-10-CM

## 2025-09-16 PROCEDURE — 20610 DRAIN/INJ JOINT/BURSA W/O US: CPT | Mod: RT

## 2025-09-16 PROCEDURE — 73564 X-RAY EXAM KNEE 4 OR MORE: CPT | Mod: RT

## 2025-09-16 PROCEDURE — 99215 OFFICE O/P EST HI 40 MIN: CPT | Mod: 25

## (undated) DEVICE — GLV 7.5 ULTRAFREE MAX

## (undated) DEVICE — DRAPE 1/2 SHEET 40X57"

## (undated) DEVICE — NDL SPINAL 22G X 3.5" QUINCKE

## (undated) DEVICE — TUBING IV EXTENSION PUMP MICROBORE LUER LOCK 36"

## (undated) DEVICE — DRAPE PEDIATRIC DIRECTIONAL INCISION

## (undated) DEVICE — PREP DURAPREP 6CC

## (undated) DEVICE — DRAPE TOWEL BLUE 17" X 24"

## (undated) DEVICE — TRAY EPIDURAL SINGLE DOSE

## (undated) DEVICE — DRSG 4 X 4" 6PLY